# Patient Record
Sex: FEMALE | Race: ASIAN | NOT HISPANIC OR LATINO | URBAN - METROPOLITAN AREA
[De-identification: names, ages, dates, MRNs, and addresses within clinical notes are randomized per-mention and may not be internally consistent; named-entity substitution may affect disease eponyms.]

---

## 2020-08-06 ENCOUNTER — INPATIENT (INPATIENT)
Facility: HOSPITAL | Age: 45
LOS: 2 days | Discharge: HOME | End: 2020-08-09
Attending: FAMILY MEDICINE | Admitting: FAMILY MEDICINE
Payer: COMMERCIAL

## 2020-08-06 VITALS
DIASTOLIC BLOOD PRESSURE: 121 MMHG | TEMPERATURE: 98 F | RESPIRATION RATE: 20 BRPM | HEART RATE: 88 BPM | OXYGEN SATURATION: 99 % | SYSTOLIC BLOOD PRESSURE: 243 MMHG

## 2020-08-06 LAB
ALBUMIN SERPL ELPH-MCNC: 5.1 G/DL — SIGNIFICANT CHANGE UP (ref 3.5–5.2)
ALP SERPL-CCNC: 83 U/L — SIGNIFICANT CHANGE UP (ref 30–115)
ALT FLD-CCNC: 15 U/L — SIGNIFICANT CHANGE UP (ref 0–41)
ANION GAP SERPL CALC-SCNC: 17 MMOL/L — HIGH (ref 7–14)
ANISOCYTOSIS BLD QL: SIGNIFICANT CHANGE UP
APPEARANCE UR: CLEAR — SIGNIFICANT CHANGE UP
AST SERPL-CCNC: 20 U/L — SIGNIFICANT CHANGE UP (ref 0–41)
BACTERIA # UR AUTO: NEGATIVE — SIGNIFICANT CHANGE UP
BASOPHILS # BLD AUTO: 0.1 K/UL — SIGNIFICANT CHANGE UP (ref 0–0.2)
BASOPHILS NFR BLD AUTO: 0.9 % — SIGNIFICANT CHANGE UP (ref 0–1)
BILIRUB SERPL-MCNC: 0.2 MG/DL — SIGNIFICANT CHANGE UP (ref 0.2–1.2)
BILIRUB UR-MCNC: NEGATIVE — SIGNIFICANT CHANGE UP
BUN SERPL-MCNC: 14 MG/DL — SIGNIFICANT CHANGE UP (ref 10–20)
CALCIUM SERPL-MCNC: 10.2 MG/DL — HIGH (ref 8.5–10.1)
CHLORIDE SERPL-SCNC: 102 MMOL/L — SIGNIFICANT CHANGE UP (ref 98–110)
CO2 SERPL-SCNC: 21 MMOL/L — SIGNIFICANT CHANGE UP (ref 17–32)
COLOR SPEC: SIGNIFICANT CHANGE UP
CREAT SERPL-MCNC: 1 MG/DL — SIGNIFICANT CHANGE UP (ref 0.7–1.5)
DIFF PNL FLD: ABNORMAL
EOSINOPHIL # BLD AUTO: 0.19 K/UL — SIGNIFICANT CHANGE UP (ref 0–0.7)
EOSINOPHIL NFR BLD AUTO: 1.7 % — SIGNIFICANT CHANGE UP (ref 0–8)
EPI CELLS # UR: 3 /HPF — SIGNIFICANT CHANGE UP (ref 0–5)
GIANT PLATELETS BLD QL SMEAR: PRESENT — SIGNIFICANT CHANGE UP
GLUCOSE SERPL-MCNC: 117 MG/DL — HIGH (ref 70–99)
GLUCOSE UR QL: SIGNIFICANT CHANGE UP
HCT VFR BLD CALC: 31.8 % — LOW (ref 37–47)
HGB BLD-MCNC: 9.2 G/DL — LOW (ref 12–16)
HYALINE CASTS # UR AUTO: 0 /LPF — SIGNIFICANT CHANGE UP (ref 0–7)
HYPOCHROMIA BLD QL: SLIGHT — SIGNIFICANT CHANGE UP
KETONES UR-MCNC: ABNORMAL
LEUKOCYTE ESTERASE UR-ACNC: ABNORMAL
LYMPHOCYTES # BLD AUTO: 2.21 K/UL — SIGNIFICANT CHANGE UP (ref 1.2–3.4)
LYMPHOCYTES # BLD AUTO: 20.3 % — LOW (ref 20.5–51.1)
MAGNESIUM SERPL-MCNC: 2.5 MG/DL — HIGH (ref 1.8–2.4)
MANUAL SMEAR VERIFICATION: SIGNIFICANT CHANGE UP
MCHC RBC-ENTMCNC: 21 PG — LOW (ref 27–31)
MCHC RBC-ENTMCNC: 28.9 G/DL — LOW (ref 32–37)
MCV RBC AUTO: 72.6 FL — LOW (ref 81–99)
METAMYELOCYTES # FLD: 1.8 % — HIGH (ref 0–0)
MICROCYTES BLD QL: SIGNIFICANT CHANGE UP
MONOCYTES # BLD AUTO: 0.68 K/UL — HIGH (ref 0.1–0.6)
MONOCYTES NFR BLD AUTO: 6.2 % — SIGNIFICANT CHANGE UP (ref 1.7–9.3)
NEUTROPHILS # BLD AUTO: 7.34 K/UL — HIGH (ref 1.4–6.5)
NEUTROPHILS NFR BLD AUTO: 65.5 % — SIGNIFICANT CHANGE UP (ref 42.2–75.2)
NEUTS BAND # BLD: 1.8 % — SIGNIFICANT CHANGE UP (ref 0–6)
NITRITE UR-MCNC: NEGATIVE — SIGNIFICANT CHANGE UP
OVALOCYTES BLD QL SMEAR: SLIGHT — SIGNIFICANT CHANGE UP
PH UR: 8 — SIGNIFICANT CHANGE UP (ref 5–8)
PLAT MORPH BLD: NORMAL — SIGNIFICANT CHANGE UP
PLATELET # BLD AUTO: 421 K/UL — HIGH (ref 130–400)
POIKILOCYTOSIS BLD QL AUTO: SIGNIFICANT CHANGE UP
POLYCHROMASIA BLD QL SMEAR: SIGNIFICANT CHANGE UP
POTASSIUM SERPL-MCNC: 3.7 MMOL/L — SIGNIFICANT CHANGE UP (ref 3.5–5)
POTASSIUM SERPL-SCNC: 3.7 MMOL/L — SIGNIFICANT CHANGE UP (ref 3.5–5)
PROT SERPL-MCNC: 7.4 G/DL — SIGNIFICANT CHANGE UP (ref 6–8)
PROT UR-MCNC: ABNORMAL
RBC # BLD: 4.38 M/UL — SIGNIFICANT CHANGE UP (ref 4.2–5.4)
RBC # FLD: 18.4 % — HIGH (ref 11.5–14.5)
RBC BLD AUTO: ABNORMAL
RBC CASTS # UR COMP ASSIST: 194 /HPF — HIGH (ref 0–4)
SODIUM SERPL-SCNC: 140 MMOL/L — SIGNIFICANT CHANGE UP (ref 135–146)
SP GR SPEC: 1.01 — SIGNIFICANT CHANGE UP (ref 1.01–1.02)
TROPONIN T SERPL-MCNC: <0.01 NG/ML — SIGNIFICANT CHANGE UP
UROBILINOGEN FLD QL: SIGNIFICANT CHANGE UP
VARIANT LYMPHS # BLD: 1.8 % — SIGNIFICANT CHANGE UP (ref 0–5)
WBC # BLD: 10.9 K/UL — HIGH (ref 4.8–10.8)
WBC # FLD AUTO: 10.9 K/UL — HIGH (ref 4.8–10.8)
WBC UR QL: 14 /HPF — HIGH (ref 0–5)

## 2020-08-06 PROCEDURE — 70496 CT ANGIOGRAPHY HEAD: CPT | Mod: 26

## 2020-08-06 PROCEDURE — 99291 CRITICAL CARE FIRST HOUR: CPT

## 2020-08-06 PROCEDURE — 93010 ELECTROCARDIOGRAM REPORT: CPT

## 2020-08-06 PROCEDURE — 70450 CT HEAD/BRAIN W/O DYE: CPT | Mod: 26,59

## 2020-08-06 PROCEDURE — 71045 X-RAY EXAM CHEST 1 VIEW: CPT | Mod: 26

## 2020-08-06 PROCEDURE — 93979 VASCULAR STUDY: CPT | Mod: 26

## 2020-08-06 PROCEDURE — 74177 CT ABD & PELVIS W/CONTRAST: CPT | Mod: 26

## 2020-08-06 PROCEDURE — 93010 ELECTROCARDIOGRAM REPORT: CPT | Mod: 77

## 2020-08-06 RX ORDER — METOPROLOL TARTRATE 50 MG
50 TABLET ORAL DAILY
Refills: 0 | Status: DISCONTINUED | OUTPATIENT
Start: 2020-08-07 | End: 2020-08-07

## 2020-08-06 RX ORDER — FERROUS SULFATE 325(65) MG
1 TABLET ORAL
Qty: 0 | Refills: 0 | DISCHARGE

## 2020-08-06 RX ORDER — ACETAMINOPHEN 500 MG
650 TABLET ORAL ONCE
Refills: 0 | Status: COMPLETED | OUTPATIENT
Start: 2020-08-06 | End: 2020-08-06

## 2020-08-06 RX ORDER — ONDANSETRON 8 MG/1
4 TABLET, FILM COATED ORAL ONCE
Refills: 0 | Status: COMPLETED | OUTPATIENT
Start: 2020-08-06 | End: 2020-08-06

## 2020-08-06 RX ORDER — HEPARIN SODIUM 5000 [USP'U]/ML
5000 INJECTION INTRAVENOUS; SUBCUTANEOUS EVERY 8 HOURS
Refills: 0 | Status: DISCONTINUED | OUTPATIENT
Start: 2020-08-06 | End: 2020-08-09

## 2020-08-06 RX ORDER — PANTOPRAZOLE SODIUM 20 MG/1
40 TABLET, DELAYED RELEASE ORAL
Refills: 0 | Status: DISCONTINUED | OUTPATIENT
Start: 2020-08-06 | End: 2020-08-09

## 2020-08-06 RX ORDER — CHLORHEXIDINE GLUCONATE 213 G/1000ML
1 SOLUTION TOPICAL DAILY
Refills: 0 | Status: DISCONTINUED | OUTPATIENT
Start: 2020-08-06 | End: 2020-08-09

## 2020-08-06 RX ORDER — LISINOPRIL 2.5 MG/1
10 TABLET ORAL DAILY
Refills: 0 | Status: DISCONTINUED | OUTPATIENT
Start: 2020-08-06 | End: 2020-08-09

## 2020-08-06 RX ORDER — NICARDIPINE HYDROCHLORIDE 30 MG/1
5 CAPSULE, EXTENDED RELEASE ORAL
Qty: 40 | Refills: 0 | Status: DISCONTINUED | OUTPATIENT
Start: 2020-08-06 | End: 2020-08-07

## 2020-08-06 RX ORDER — NICARDIPINE HYDROCHLORIDE 30 MG/1
5 CAPSULE, EXTENDED RELEASE ORAL
Qty: 40 | Refills: 0 | Status: DISCONTINUED | OUTPATIENT
Start: 2020-08-06 | End: 2020-08-06

## 2020-08-06 RX ORDER — FERROUS SULFATE 325(65) MG
325 TABLET ORAL THREE TIMES A DAY
Refills: 0 | Status: DISCONTINUED | OUTPATIENT
Start: 2020-08-06 | End: 2020-08-09

## 2020-08-06 RX ORDER — PANTOPRAZOLE SODIUM 20 MG/1
40 TABLET, DELAYED RELEASE ORAL DAILY
Refills: 0 | Status: DISCONTINUED | OUTPATIENT
Start: 2020-08-06 | End: 2020-08-06

## 2020-08-06 RX ADMIN — NICARDIPINE HYDROCHLORIDE 25 MG/HR: 30 CAPSULE, EXTENDED RELEASE ORAL at 15:56

## 2020-08-06 RX ADMIN — Medication 650 MILLIGRAM(S): at 23:01

## 2020-08-06 RX ADMIN — ONDANSETRON 4 MILLIGRAM(S): 8 TABLET, FILM COATED ORAL at 15:55

## 2020-08-06 RX ADMIN — PANTOPRAZOLE SODIUM 40 MILLIGRAM(S): 20 TABLET, DELAYED RELEASE ORAL at 23:31

## 2020-08-06 NOTE — ED PROVIDER NOTE - OBJECTIVE STATEMENT
45yF pmhx HTN c/o dizziness starting earlier this morning, worsening; intermittent; associated w/ nausea; states she took BP at home was 230s systolic. Reports recently being rx'ed metoprolol which she has been taking approx 5 days, as well as anemia Hb 8. Pt denies fever/chills, chest pain, SOB, abd pain. 45yF pmhx HTN c/o dizziness starting earlier this morning, worsening; intermittent; associated w/ nausea; states she took BP at home was 230s systolic. Reports recently being rx'ed metoprolol which she has been taking approx 5 days, as well as anemia Hb 8 last week. Pt denies fever/chills, chest pain, SOB, abd pain. 45yF pmhx HTN c/o dizziness starting earlier this morning, worsening; intermittent; associated w/ nausea; states she took BP at home was 230s systolic. Reports recently being rx'ed metoprolol which she has been taking approx 5 days, as well as anemia Hb 8 last week. Pt denies fever/chills, chest pain, SOB, abd pain.   Please call Dr Jaden Arvizu w/ any updates [phone].

## 2020-08-06 NOTE — H&P ADULT - ASSESSMENT
IMPRESSION:  Hypertensive emergency   Proteinuria/ dizziness     PLAN:    CNS: monitor for signs of increased ICP    HEENT: Oral care    PULMONARY:  HOB @ 45 degrees, no signs of pulmonary edema     CARDIOVASCULAR: control bp    GI: GI prophylaxis.  Feeding     RENAL:  Follow up lytes.  Correct as needed    INFECTIOUS DISEASE: Follow up cultures    HEMATOLOGICAL:  DVT prophylaxis.    ENDOCRINE:  Follow up FS.  Insulin protocol if needed.    MUSCULOSKELETAL: IMPRESSION:  Hypertensive emergency   Proteinuria/ nausea vomiting and dizziness     PLAN:    CNS: Ct head negative, CT angio negative for LVO- fu MRI brain and neuro c/s -monitor for signs of increased ICP    HEENT: Oral care    PULMONARY:  HOB @ 45 degrees, no signs of pulmonary edema     CARDIOVASCULAR: started nicardipine, control systolic bp to 25-35 % decrease in bp for first 23 hours; so target systolic around 160 mmHg transition to PO meds in am consider adding ACEi to metoprolol home dose due to proteinuria; fu echo to r/o LVH      GI: GI prophylaxis.  Feeding     RENAL:  Follow up lytes.  Correct as needed    INFECTIOUS DISEASE: Follow up cultures    HEMATOLOGICAL:  DVT prophylaxis.    ENDOCRINE:  Follow up FS.  Insulin protocol if needed. IMPRESSION:  Hypertensive emergency   Proteinuria/ nausea vomiting and dizziness     PLAN:    CNS: Ct head negative, CT angio negative for LVO- fu MRI brain and neuro c/s -monitor for signs of increased ICP    HEENT: Oral care    PULMONARY:  HOB @ 45 degrees, no signs of pulmonary edema     CARDIOVASCULAR: started nicardipine, control systolic bp to 25-35 % decrease in bp for first 23 hours; so target systolic around 160 mmHg transition to PO meds in am- started lisinopril as has proteinuria; fu echo to r/o LVH  titrate off drip       GI: GI prophylaxis.  Feeding     RENAL:  Follow up lytes.  Correct as needed    INFECTIOUS DISEASE: Follow up cultures    HEMATOLOGICAL:  DVT prophylaxis.    ENDOCRINE:  Follow up FS.  Insulin protocol if needed. IMPRESSION:    Hypertensive urgency  Proteinuria/ nausea vomiting and dizziness   ? WES    PLAN:    CNS: Ct head negative, CT angio negative for LVO- fu MRI brain    HEENT: Oral care    PULMONARY:  HOB @ 45 degrees,    CARDIOVASCULAR: ECHO, metoprolol, ACE I    GI: GI prophylaxis.  Feeding     RENAL:  Follow up lytes.  Correct as needed    INFECTIOUS DISEASE: Follow up cultures    HEMATOLOGICAL:  DVT prophylaxis.    ENDOCRINE:  Follow up FS.  Insulin protocol if needed.    transfer to floor

## 2020-08-06 NOTE — PROGRESS NOTE ADULT - ASSESSMENT
HTN crisis  dizziness, N/V, diaphoresis  - on Nicardipine drip at this time, SBP now in 160s  - in ICU; appreciate ICU  and care  - workup ongoing; CT abd and CT/CTA of brain noted.  Await renal sono/doppler and echo, serum and urine catecholamines, other labs  - Cardiology Consult - [facility]  - Neuro consult appreciated; will follow per their recommendations    Iron-deficient anemia  - continue to monitor Hb  - stable at present    Remote hx of ovarian mass resection  - CT Abd/pelvis results noted.  Can f/u with markers later after initial crisis resolved    Continue all other meds and management for current conditions  Prognosis guarded  Will follow with ICU team leading for now  All follow-up appreciated

## 2020-08-06 NOTE — ED PROVIDER NOTE - PROGRESS NOTE DETAILS
AG: d/w Dr Arvizu asks for Ct abd/pel and adrenal labs, d/w neuro recs CTA head s/o to Dr. Capps, AG: ICU requests Utox and lactate

## 2020-08-06 NOTE — CONSULT NOTE ADULT - ASSESSMENT
Impression:  46 yo woman pmhx HTN presents with dizziness starting earlier this morning, worsening; intermittent; associated w/ nausea; states she took BP at home was 230s systolic. Reports recently being prescribed metoprolol which she has been taking for 5 days. NIHSS 0 dizziness more associated with lightheadedness and less room spinning.     Suggestion:  Control blood pressure  CTH now  CTA head and neck  Can do MRI brain without robert.     Lloyd Mason NP  [phone]

## 2020-08-06 NOTE — ED ADULT TRIAGE NOTE - CHIEF COMPLAINT QUOTE
c/o hypertension x few days, Patient recently started on metoprolol. +vertigo c/o hypertension x few days, Patient recently started on metoprolol. +vertigo +vomiting

## 2020-08-06 NOTE — CONSULT NOTE ADULT - SUBJECTIVE AND OBJECTIVE BOX
Neurology Consult    Patient is a 45y old  Female who presents with a chief complaint of dizziness	    HPI:  44 yo woman pmhx HTN presents with dizziness starting earlier this morning, worsening; intermittent; associated w/ nausea; states she took BP at home was 230s systolic. Reports recently being prescribed metoprolol which she has been taking for 5 days.     PAST MEDICAL & SURGICAL HISTORY:  Hypertension  No significant past surgical history          MEDICATIONS  (STANDING):  niCARdipine Infusion 5 mG/Hr (25 mL/Hr) IV Continuous <Continuous>    MEDICATIONS  (PRN):    Vital Signs Last 24 Hrs  T(C): 36.9 (06 Aug 2020 15:25), Max: 36.9 (06 Aug 2020 15:25)  T(F): 98.4 (06 Aug 2020 15:25), Max: 98.4 (06 Aug 2020 15:25)  HR: 96 (06 Aug 2020 16:57) (75 - 97)  BP: 168/88 (06 Aug 2020 16:57) (163/84 - 243/121)  BP(mean): --  RR: 18 (06 Aug 2020 16:00) (18 - 20)  SpO2: 100% (06 Aug 2020 16:00) (99% - 100%)    Examination:  General:  Appearance is consistent with chronologic age.  No abnormal facies.  Gross skin survey within normal limits.    Cognitive/Language:  The patient is oriented to person, place, time and date.  Recent and remote memory intact.  Fund of knowledge is intact and normal.  Language with normal repetition, comprehension and naming.  Nondysarthric.    Eyes: intact VA, VFF.  EOMI w/o nystagmus, skew or reported double vision.  PERRL.  No ptosis/weakness of eyelid closure.    Face:  Facial sensation normal V1 - 3, no facial asymmetry.    Motor examination:   Normal tone, bulk and range of motion.  No tenderness, twitching, tremors or involuntary movements.  Formal Muscle Strength Testing: (MRC grade R/L) 5/5 UE; 5/5 LE.  No observable drift.  Cerebellum:   FTN/HKS intact with normal VICTOR MANUEL in all limbs.  No dysmetria or dysdiadokinesia.  Gait deferred    NIHSS 0    Labs:   CBC Full  -  ( 06 Aug 2020 15:55 )  WBC Count : 10.90 K/uL  RBC Count : 4.38 M/uL  Hemoglobin : 9.2 g/dL  Hematocrit : 31.8 %  Platelet Count - Automated : 421 K/uL  Mean Cell Volume : 72.6 fL  Mean Cell Hemoglobin : 21.0 pg  Mean Cell Hemoglobin Concentration : 28.9 g/dL  Auto Neutrophil # : 7.34 K/uL  Auto Lymphocyte # : 2.21 K/uL  Auto Monocyte # : 0.68 K/uL  Auto Eosinophil # : 0.19 K/uL  Auto Basophil # : 0.10 K/uL  Auto Neutrophil % : 65.5 %  Auto Lymphocyte % : 20.3 %  Auto Monocyte % : 6.2 %  Auto Eosinophil % : 1.7 %  Auto Basophil % : 0.9 %        140  |  102  |  14  ----------------------------<  117<H>  3.7   |  21  |  1.0    Ca    10.2<H>      06 Aug 2020 15:55    TPro  7.4  /  Alb  5.1  /  TBili  0.2  /  DBili  x   /  AST  20  /  ALT  15  /  AlkPhos  83  08-06    LIVER FUNCTIONS - ( 06 Aug 2020 15:55 )  Alb: 5.1 g/dL / Pro: 7.4 g/dL / ALK PHOS: 83 U/L / ALT: 15 U/L / AST: 20 U/L / GGT: x             Urinalysis Basic - ( 06 Aug 2020 18:00 )    Color: Light Yellow / Appearance: Clear / S.013 / pH: x  Gluc: x / Ketone: Small  / Bili: Negative / Urobili: <2 mg/dL   Blood: x / Protein: 30 mg/dL / Nitrite: Negative   Leuk Esterase: Small / RBC: x / WBC x   Sq Epi: x / Non Sq Epi: x / Bacteria: x          Neuroimaging:  NCHCT:   pending  20 @ 19:55

## 2020-08-06 NOTE — ED PROVIDER NOTE - CLINICAL SUMMARY MEDICAL DECISION MAKING FREE TEXT BOX
I personally evaluated the patient. I reviewed the Resident’s or Physician Assistant’s note (as assigned above), and agree with the findings and plan except as documented in my note. Patient signed out to me from Dr. Wallis. Pending Ct scans. Labs, Ct, ekg performed. Cardene drip initiated from ED. Nausea and sx began resolving while in ED, neurologically intact. Discussed with PMD Dr. Arvizu. Critical care/icu consult placed. Admitted to icu for further evaluation and treatment.

## 2020-08-06 NOTE — ED PROVIDER NOTE - PHYSICAL EXAMINATION
Vital Signs: Reviewed  GEN: alert, NAD, speaks full sentences  HEAD:  normocephalic, atraumatic  EYES:  PERRLA; conjunctivae without injection, drainage or discharge  ENMT:  nasal mucosa moist; mouth moist without ulcerations or lesions; throat moist without erythema, exudate, ulcerations or lesions  NECK:  supple  CARDIAC:  regular rate, normal S1 and S2, no murmurs  RESP:  respiratory rate and effort appear normal for age; lungs are clear to auscultation bilaterally; no rales or wheezes  ABDOMEN:  soft, nontender, nondistended  MUSCULOSKELETAL/NEURO:  normal movement, normal tone  SKIN:  normal skin color for age and race, well-perfused; warm and dry

## 2020-08-06 NOTE — ED PROVIDER NOTE - NS ED ROS FT
Review of Systems:  	•	CONSTITUTIONAL - no fever, no diaphoresis  	•	SKIN - no rash, no lesions  	•	HEMATOLOGIC - no bleeding, no bruising  	•	EYES - no discharge, no injection  	•	ENT - no sore throat, no runny nose  	•	RESPIRATORY - no shortness of breath, no cough  	•	CARDIAC - no chest pain, no palpitations  	•	GI - no abd pain, +nausea, no vomiting, no diarrhea  	•	GENITO-URINARY - no dysuria, no hematuria  	•	MUSCULOSKELETAL - no joint pain, no muscle aches  	•	NEUROLOGIC - +dizziness, no headache

## 2020-08-06 NOTE — H&P ADULT - HISTORY OF PRESENT ILLNESS
46 yo woman pmhx HTN presents with dizziness starting earlier this morning, worsening; intermittent; associated w/ nausea . Patient reporting dizziness more associated with lightheadedness but no room spinning. She states she took BP at home was 230s systolic. No signs of any agitation, delirium, stupor, seizures, or visual disturbances.   She denies any chest pain, and no shortness of breath. Patient reports recently being prescribed metoprolol which she has been taking for 5 days.    ER course on admission vitals were as bp 243/121 mmHG, HR 88 bpm, patient is afebrile   labs revealed a Hg of 9.2 Cr 1.0, troponin negative   EKG done revealed T wave inversion in leads II, III, avF . UA revealed proteinuria   CT brain was done that was negative and CT abd/pelvis negative   neuro consulted NIHSS score 0, and CTAngio head and neck done   patient started on nicardipine drip 44 yo female with pmh of HTN, heavy menses and iron def anemia, ? migraines, ovarian mass removal; no f/u and no meds for years; recently started on anti-HTN meds (metoprolol succinate up to 50 mg QD) when routine eye dr visit revealed a SBP in the 220's; initially pt had some reduction in SBP down to the 170's but today pt had dizziness in the early am which worsened and led to nausea and vomiting.  she took her BP at the time despite being on meds was 220's/120's; when she presented to the ED her BP max was 244/114. she was very dizzy described more lightheadedness than room spinning and nauseous, and diaphoretic; patient complained of some chest pressure while coming to the ED from [place] . Patient reporting dizziness more associated with lightheadedness but no room spinning. No signs of any agitation, delirium, stupor, seizures, or visual disturbances.   She denies any chest pain, and no shortness of breath. Patient reports recently being prescribed metoprolol which she has been taking for 5 days.    ER course on admission vitals were as bp 243/121 mmHG, HR 88 bpm, patient is afebrile   labs revealed a Hg of 9.2 Cr 1.0, troponin negative   EKG done revealed T wave inversion in leads II, III, avF . UA revealed proteinuria   CT brain was done that was negative and CT abd/pelvis negative   neuro consulted NIHSS score 0, and CTAngio head and neck done   patient started on nicardipine drip 46 yo female with pmh of HTN, heavy menses and iron def anemia, ? migraines, ovarian mass removal; no f/u and no meds for years; recently started on anti-HTN meds (metoprolol succinate up to 50 mg QD) when routine eye dr visit revealed a SBP in the 220's; initially pt had some reduction in SBP down to the 170's but today pt had dizziness in the early am which worsened and led to nausea and vomiting.  she took her BP at the time despite being on meds was 220's/120's; when she presented to the ED her BP max was 244/114. she was very dizzy described more lightheadedness than room spinning and nauseous, and diaphoretic; patient complained of some chest pressure while coming to the ED from [place] . Patient reporting dizziness more associated with lightheadedness but no room spinning. No signs of any agitation, delirium, stupor, seizures, or visual disturbances.   She denies any chest pain, and no shortness of breath. Patient reports recently being prescribed metoprolol which she has been taking for 5 days.    ER course on admission vitals were as bp 243/121 mmHG, HR 88 bpm, patient is afebrile   labs revealed a Hg of 9.2 Cr 1.0, troponin negative   EKG done revealed T wave inversion in leads II, III, avF . UA revealed proteinuria     CT brain was done that was negative and CT abd/pelvis negative   neuro consulted NIHSS score 0, and CTAngio head and neck done   patient started on nicardipine drip now off

## 2020-08-06 NOTE — ED PROVIDER NOTE - ATTENDING CONTRIBUTION TO CARE
45 F to ED with CP, diziness and elevated bp  h/o HTN uncontrolled at home with little primary care  worsening sx over past few days so to ED for eval.   PT's PMD tried otc meds but not improving.  AVSS, exam as noted, CTAB, RRR, abdomen soft NTND, (+) bowel sounds,

## 2020-08-06 NOTE — H&P ADULT - NSHPLABSRESULTS_GEN_ALL_CORE
9.2    10.90 )-----------( 421      ( 06 Aug 2020 15:55 )             31.8       08-06    140  |  102  |  14  ----------------------------<  117<H>  3.7   |  21  |  1.0    Ca    10.2<H>      06 Aug 2020 15:55    TPro  7.4  /  Alb  5.1  /  TBili  0.2  /  DBili  x   /  AST  20  /  ALT  15  /  AlkPhos  83  08-06      LIVER FUNCTIONS - ( 06 Aug 2020 15:55 )  Alb: 5.1 g/dL / Pro: 7.4 g/dL / ALK PHOS: 83 U/L / ALT: 15 U/L / AST: 20 U/L / GGT: x                 Urinalysis Basic - ( 06 Aug 2020 18:00 )    Color: Light Yellow / Appearance: Clear / S.013 / pH: x  Gluc: x / Ketone: Small  / Bili: Negative / Urobili: <2 mg/dL   Blood: x / Protein: 30 mg/dL / Nitrite: Negative   Leuk Esterase: Small / RBC: 194 /HPF / WBC 14 /HPF   Sq Epi: x / Non Sq Epi: 3 /HPF / Bacteria: Negative            CARDIAC MARKERS ( 06 Aug 2020 15:55 )  x     / <0.01 ng/mL / x     / x     / x

## 2020-08-07 LAB
ALBUMIN SERPL ELPH-MCNC: 4.7 G/DL — SIGNIFICANT CHANGE UP (ref 3.5–5.2)
ALP SERPL-CCNC: 74 U/L — SIGNIFICANT CHANGE UP (ref 30–115)
ALT FLD-CCNC: 13 U/L — SIGNIFICANT CHANGE UP (ref 0–41)
ANION GAP SERPL CALC-SCNC: 13 MMOL/L — SIGNIFICANT CHANGE UP (ref 7–14)
ANION GAP SERPL CALC-SCNC: 14 MMOL/L — SIGNIFICANT CHANGE UP (ref 7–14)
AST SERPL-CCNC: 14 U/L — SIGNIFICANT CHANGE UP (ref 0–41)
BASOPHILS # BLD AUTO: 0.04 K/UL — SIGNIFICANT CHANGE UP (ref 0–0.2)
BASOPHILS NFR BLD AUTO: 0.3 % — SIGNIFICANT CHANGE UP (ref 0–1)
BILIRUB SERPL-MCNC: 0.4 MG/DL — SIGNIFICANT CHANGE UP (ref 0.2–1.2)
BUN SERPL-MCNC: 14 MG/DL — SIGNIFICANT CHANGE UP (ref 10–20)
BUN SERPL-MCNC: 15 MG/DL — SIGNIFICANT CHANGE UP (ref 10–20)
CALCIUM SERPL-MCNC: 9.7 MG/DL — SIGNIFICANT CHANGE UP (ref 8.5–10.1)
CALCIUM SERPL-MCNC: 9.9 MG/DL — SIGNIFICANT CHANGE UP (ref 8.5–10.1)
CHLORIDE SERPL-SCNC: 101 MMOL/L — SIGNIFICANT CHANGE UP (ref 98–110)
CHLORIDE SERPL-SCNC: 103 MMOL/L — SIGNIFICANT CHANGE UP (ref 98–110)
CO2 SERPL-SCNC: 23 MMOL/L — SIGNIFICANT CHANGE UP (ref 17–32)
CO2 SERPL-SCNC: 24 MMOL/L — SIGNIFICANT CHANGE UP (ref 17–32)
CREAT SERPL-MCNC: 0.9 MG/DL — SIGNIFICANT CHANGE UP (ref 0.7–1.5)
CREAT SERPL-MCNC: 0.9 MG/DL — SIGNIFICANT CHANGE UP (ref 0.7–1.5)
EOSINOPHIL # BLD AUTO: 0.02 K/UL — SIGNIFICANT CHANGE UP (ref 0–0.7)
EOSINOPHIL NFR BLD AUTO: 0.1 % — SIGNIFICANT CHANGE UP (ref 0–8)
GLUCOSE SERPL-MCNC: 115 MG/DL — HIGH (ref 70–99)
GLUCOSE SERPL-MCNC: 116 MG/DL — HIGH (ref 70–99)
HCT VFR BLD CALC: 28.8 % — LOW (ref 37–47)
HGB BLD-MCNC: 8.3 G/DL — LOW (ref 12–16)
IMM GRANULOCYTES NFR BLD AUTO: 0.8 % — HIGH (ref 0.1–0.3)
LYMPHOCYTES # BLD AUTO: 1.6 K/UL — SIGNIFICANT CHANGE UP (ref 1.2–3.4)
LYMPHOCYTES # BLD AUTO: 11.6 % — LOW (ref 20.5–51.1)
MAGNESIUM SERPL-MCNC: 2.5 MG/DL — HIGH (ref 1.8–2.4)
MCHC RBC-ENTMCNC: 20.9 PG — LOW (ref 27–31)
MCHC RBC-ENTMCNC: 28.8 G/DL — LOW (ref 32–37)
MCV RBC AUTO: 72.5 FL — LOW (ref 81–99)
MONOCYTES # BLD AUTO: 0.6 K/UL — SIGNIFICANT CHANGE UP (ref 0.1–0.6)
MONOCYTES NFR BLD AUTO: 4.4 % — SIGNIFICANT CHANGE UP (ref 1.7–9.3)
NEUTROPHILS # BLD AUTO: 11.38 K/UL — HIGH (ref 1.4–6.5)
NEUTROPHILS NFR BLD AUTO: 82.8 % — HIGH (ref 42.2–75.2)
NRBC # BLD: 0 /100 WBCS — SIGNIFICANT CHANGE UP (ref 0–0)
PLATELET # BLD AUTO: 379 K/UL — SIGNIFICANT CHANGE UP (ref 130–400)
POTASSIUM SERPL-MCNC: 3.5 MMOL/L — SIGNIFICANT CHANGE UP (ref 3.5–5)
POTASSIUM SERPL-MCNC: 4 MMOL/L — SIGNIFICANT CHANGE UP (ref 3.5–5)
POTASSIUM SERPL-SCNC: 3.5 MMOL/L — SIGNIFICANT CHANGE UP (ref 3.5–5)
POTASSIUM SERPL-SCNC: 4 MMOL/L — SIGNIFICANT CHANGE UP (ref 3.5–5)
PROT SERPL-MCNC: 6.7 G/DL — SIGNIFICANT CHANGE UP (ref 6–8)
RBC # BLD: 3.97 M/UL — LOW (ref 4.2–5.4)
RBC # FLD: 18.2 % — HIGH (ref 11.5–14.5)
SARS-COV-2 RNA SPEC QL NAA+PROBE: SIGNIFICANT CHANGE UP
SODIUM SERPL-SCNC: 138 MMOL/L — SIGNIFICANT CHANGE UP (ref 135–146)
SODIUM SERPL-SCNC: 140 MMOL/L — SIGNIFICANT CHANGE UP (ref 135–146)
WBC # BLD: 13.75 K/UL — HIGH (ref 4.8–10.8)
WBC # FLD AUTO: 13.75 K/UL — HIGH (ref 4.8–10.8)

## 2020-08-07 PROCEDURE — 70551 MRI BRAIN STEM W/O DYE: CPT | Mod: 26

## 2020-08-07 PROCEDURE — 74174 CTA ABD&PLVS W/CONTRAST: CPT | Mod: 26

## 2020-08-07 PROCEDURE — 99291 CRITICAL CARE FIRST HOUR: CPT

## 2020-08-07 PROCEDURE — 93306 TTE W/DOPPLER COMPLETE: CPT | Mod: 26

## 2020-08-07 PROCEDURE — 71045 X-RAY EXAM CHEST 1 VIEW: CPT | Mod: 26

## 2020-08-07 RX ORDER — NIFEDIPINE 30 MG
30 TABLET, EXTENDED RELEASE 24 HR ORAL ONCE
Refills: 0 | Status: DISCONTINUED | OUTPATIENT
Start: 2020-08-07 | End: 2020-08-07

## 2020-08-07 RX ORDER — NICARDIPINE HYDROCHLORIDE 30 MG/1
5 CAPSULE, EXTENDED RELEASE ORAL
Qty: 40 | Refills: 0 | Status: DISCONTINUED | OUTPATIENT
Start: 2020-08-07 | End: 2020-08-07

## 2020-08-07 RX ORDER — NIFEDIPINE 30 MG
30 TABLET, EXTENDED RELEASE 24 HR ORAL DAILY
Refills: 0 | Status: DISCONTINUED | OUTPATIENT
Start: 2020-08-07 | End: 2020-08-07

## 2020-08-07 RX ORDER — NIFEDIPINE 30 MG
60 TABLET, EXTENDED RELEASE 24 HR ORAL DAILY
Refills: 0 | Status: DISCONTINUED | OUTPATIENT
Start: 2020-08-08 | End: 2020-08-09

## 2020-08-07 RX ORDER — NIFEDIPINE 30 MG
30 TABLET, EXTENDED RELEASE 24 HR ORAL ONCE
Refills: 0 | Status: COMPLETED | OUTPATIENT
Start: 2020-08-07 | End: 2020-08-07

## 2020-08-07 RX ORDER — ONDANSETRON 8 MG/1
4 TABLET, FILM COATED ORAL ONCE
Refills: 0 | Status: COMPLETED | OUTPATIENT
Start: 2020-08-07 | End: 2020-08-07

## 2020-08-07 RX ORDER — LABETALOL HCL 100 MG
100 TABLET ORAL THREE TIMES A DAY
Refills: 0 | Status: DISCONTINUED | OUTPATIENT
Start: 2020-08-07 | End: 2020-08-09

## 2020-08-07 RX ADMIN — Medication 325 MILLIGRAM(S): at 22:23

## 2020-08-07 RX ADMIN — LISINOPRIL 10 MILLIGRAM(S): 2.5 TABLET ORAL at 00:52

## 2020-08-07 RX ADMIN — ONDANSETRON 4 MILLIGRAM(S): 8 TABLET, FILM COATED ORAL at 00:01

## 2020-08-07 RX ADMIN — Medication 50 MILLIGRAM(S): at 05:13

## 2020-08-07 RX ADMIN — Medication 650 MILLIGRAM(S): at 00:00

## 2020-08-07 RX ADMIN — Medication 30 MILLIGRAM(S): at 10:43

## 2020-08-07 RX ADMIN — Medication 325 MILLIGRAM(S): at 05:13

## 2020-08-07 RX ADMIN — HEPARIN SODIUM 5000 UNIT(S): 5000 INJECTION INTRAVENOUS; SUBCUTANEOUS at 14:28

## 2020-08-07 RX ADMIN — Medication 100 MILLIGRAM(S): at 22:23

## 2020-08-07 RX ADMIN — NICARDIPINE HYDROCHLORIDE 25 MG/HR: 30 CAPSULE, EXTENDED RELEASE ORAL at 07:00

## 2020-08-07 RX ADMIN — HEPARIN SODIUM 5000 UNIT(S): 5000 INJECTION INTRAVENOUS; SUBCUTANEOUS at 05:13

## 2020-08-07 RX ADMIN — Medication 30 MILLIGRAM(S): at 15:01

## 2020-08-07 RX ADMIN — HEPARIN SODIUM 5000 UNIT(S): 5000 INJECTION INTRAVENOUS; SUBCUTANEOUS at 22:23

## 2020-08-07 RX ADMIN — Medication 325 MILLIGRAM(S): at 14:29

## 2020-08-07 NOTE — CONSULT NOTE ADULT - ASSESSMENT
Patient off the drip  doing better  add Procardia XL 30 mg  echocardiogram  renal duplex for renal artery stenosis.  discussed with team in rounds

## 2020-08-07 NOTE — PROVIDER CONTACT NOTE (OTHER) - SITUATION
spoke with MD to inform that pt has a manual BP of 200/110 and a HR of 84. Currently stating she does not have symptoms. Informed MD that I gave pt PO labetalol 100mg.

## 2020-08-07 NOTE — PROGRESS NOTE ADULT - ASSESSMENT
HTN crisis  dizziness, N/V, diaphoresis - sxs improving  Mild/moderate left ROXANA per renal sono; results of renal angiogram pending  - s/p nicardipine drip; BP's fluctuating but pt not symptomatic  - s/p ICU, now on regular floor  - workup ongoing; CT abd and CT/CTA of brain noted.  Renal sono and echo noted as above.  - Cardiology f/u appreciated - [place]  - Neuro consult appreciated; will follow per their recommendations.  Await results of MRI brain    Iron-deficient anemia  - continue to monitor Hb; continue iron  - stable at present    Remote hx of ovarian mass resection  - CT Abd/pelvis results noted.  Can f/u with markers later after initial crisis resolved    Continue all other meds and management for current conditions  Prognosis guarded  Will follow with ICU team leading for now  All follow-up appreciated HTN crisis  dizziness, N/V, diaphoresis - sxs improving  Mild/moderate left ROXANA per renal sono; results of renal angiogram pending  - s/p nicardipine drip; BP's fluctuating but pt not symptomatic  - s/p ICU, now on regular floor  - workup ongoing; CT abd and CT/CTA of brain noted.  Renal sono and echo noted as above.  - Cardiology f/u appreciated - [place]  - Neuro consult appreciated; will follow per their recommendations.  Await results of MRI brain    Iron-deficient anemia  - continue to monitor Hb; continue iron  - stable at present    Remote hx of ovarian mass resection  - CT Abd/pelvis results noted.  Can f/u with markers later after initial crisis resolved    Continue all other meds and management for current conditions  Prognosis guarded but improving  All follow-up appreciated

## 2020-08-07 NOTE — PROGRESS NOTE ADULT - ASSESSMENT
This is a case of a 44 yo female KTH:  1. HTN  2. heavy menses and iron def anemia  3. migraines??  4. ovarian mass removal  5. HTN, of meds for years    Patient was admitted for hypertensive urgency /121 mmHG, HR 88 bpm,     #HTN  off niocardipine drip  evaluated by cardiology  Switched to oral meds: metoprolol, lisinopril, and procardia XR  Consider downgrade today    #dizziness  CT head negative  symptoms resolved after BP control    Diet: dash diet  Activity: As tolerated  Dispo: downgrade to regular floor

## 2020-08-07 NOTE — CONSULT NOTE ADULT - SUBJECTIVE AND OBJECTIVE BOX
Date of Admission:    CHIEF COMPLAINT: HTN    HISTORY OF PRESENT ILLNESS: 45yFemale with PMH below presented to the hospital for HTN crisis, severe headaches.    PAST MEDICAL & SURGICAL HISTORY:  Hypertension  No significant past surgical history    HEALTH ISSUES - PROBLEM Dx:        FAMILY HISTORY:  No pertinent family history in first degree relatives    Allergies    apple (Anaphylaxis)  No Known Drug Allergies    Intolerances    	  Home Medications:  ferrous sulfate 325 mg (65 mg elemental iron) oral tablet: 1 tab(s) orally 3 times a day (06 Aug 2020 23:59)  metoprolol succinate 50 mg oral tablet, extended release: 1 tab(s) orally once a day (06 Aug 2020 23:59)    MEDICATIONS  (STANDING):  chlorhexidine 4% Liquid 1 Application(s) Topical daily  ferrous    sulfate 325 milliGRAM(s) Oral three times a day  heparin   Injectable 5000 Unit(s) SubCutaneous every 8 hours  lisinopril 10 milliGRAM(s) Oral daily  metoprolol succinate ER 50 milliGRAM(s) Oral daily  niCARdipine Infusion 5 mG/Hr (25 mL/Hr) IV Continuous <Continuous>  pantoprazole    Tablet 40 milliGRAM(s) Oral before breakfast    MEDICATIONS  (PRN):              SOCIAL HISTORY:    [x ] Non-smoker  [ ] Smoker  [ ] Alcohol      REVIEW OF SYSTEMS:  CONSTITUTIONAL: No fever, weight loss, or fatigue  CARDIOLOGY: PAtient denies chest pain, shortness of breath or syncopal episodes.   RESPIRATORY: denies shortness of breath, wheezeing.   NEUROLOGICAL: NO weakness, no focal deficits to report.  ENDOCRINOLOGICAL: no recent change in diabetic medications.   GI: no BRBPR, no N,V,diarrhea.    PSYCHIATRY: normal mood and affect  HEENT: no nasal discharge, no ecchymosis  SKIN: no ecchymosis, no breakdown  MUSCULOSKELETAL: Full range of motion x4.      PHYSICAL EXAM:  T(C): 36.7 (08-07-20 @ 04:00), Max: 36.9 (08-06-20 @ 15:25)  HR: 72 (08-07-20 @ 07:00) (72 - 106)  BP: 141/92 (08-07-20 @ 07:00) (137/80 - 243/121)  RR: 18 (08-07-20 @ 07:00) (12 - 34)  SpO2: 98% (08-07-20 @ 07:00) (96% - 100%)  Wt(kg): --  I&O's Summary    06 Aug 2020 07:01  -  07 Aug 2020 07:00  --------------------------------------------------------  IN: 155 mL / OUT: 1250 mL / NET: -1095 mL    07 Aug 2020 07:01  -  07 Aug 2020 09:00  --------------------------------------------------------  IN: 0 mL / OUT: 400 mL / NET: -400 mL      Daily Height in cm: 165.1 (06 Aug 2020 21:45)    Daily     General Appearance: Normal	  Cardiovascular: Normal S1 S2, No JVD, No murmurs, No edema  Respiratory: Lungs clear to auscultation	  Psychiatry: A & O x 3, Mood & affect appropriate  Gastrointestinal:  Soft, Non-tender  Skin: No rashes, No ecchymoses, No cyanosis	  Neurologic: Non-focal  Extremities: Normal range of motion, No clubbing, cyanosis or edema  Vascular: Peripheral pulses palpable 2+ bilaterally        LABS:	 	                          8.3    13.75 )-----------( 379      ( 07 Aug 2020 04:48 )             28.8     08-07    138  |  101  |  14  ----------------------------<  116<H>  3.5   |  23  |  0.9    Ca    9.7      07 Aug 2020 04:48  Mg     2.5     08-07    TPro  6.7  /  Alb  4.7  /  TBili  0.4  /  DBili  x   /  AST  14  /  ALT  13  /  AlkPhos  74  08-07    CARDIAC MARKERS ( 06 Aug 2020 15:55 )  x     / <0.01 ng/mL / x     / x     / x              proBNP:   Lipid Profile:   HgA1c:   TSH:       CARDIAC MARKERS:            TELEMETRY EVENTS: 	    ECG:  	NSR, LVH  RADIOLOGY:  OTHER: 	    PREVIOUS DIAGNOSTIC TESTING:    [ ] Echocardiogram:  [ ]  Catheterization:  [ ] Stress Test:  	  	  ASSESSMENT/PLAN:

## 2020-08-07 NOTE — CHART NOTE - NSCHARTNOTEFT_GEN_A_CORE
This is a case of a 46 yo female KTH:  1. HTN  2. heavy menses and iron def anemia  3. migraines??  4. ovarian mass removal  5. HTN, off meds for years    recently started on anti-HTN meds (metoprolol succinate up to 50 mg QD) when routine eye dr visit revealed a SBP in the 220's; initially pt had some reduction in SBP down to the 170's but today pt had dizziness in the early am which worsened and led to nausea and vomiting.  she took her BP at the time despite being on meds was 220's/120's; when she presented to the ED her BP max was 244/114. she was very dizzy described more lightheadedness than room spinning and nauseous, and diaphoretic; patient complained of some chest pressure while coming to the ED from [place] . Patient reporting dizziness more associated with lightheadedness but no room spinning. No signs of any agitation, delirium, stupor, seizures, or visual disturbances.   She denies any chest pain, and no shortness of breath. Patient reports recently being prescribed metoprolol which she has been taking for 5 days.    ER course on admission vitals were as bp 243/121 mmHG, HR 88 bpm, patient is afebrile   labs revealed a Hg of 9.2 Cr 1.0, troponin negative   EKG done revealed T wave inversion in leads II, III, avF . UA revealed proteinuria     CT brain was done that was negative and CT abd/pelvis negative   neuro consulted NIHSS score 0, and CTAngio head and neck done     patient started on nicardipine drip, stopped today at 4 AM. currently on oral antihypertensives    Primary diagnosis of Hypertensive Urgency     Today is hospital day 1d. This morning patient was seen and examined at bedside, resting comfortably in bed.      PAST MEDICAL & SURGICAL HISTORY  Hypertension  No significant past surgical history    SOCIAL HISTORY:  Social History:  non smoker  no alcohol   no illicit drug use (06 Aug 2020 21:05)      ALLERGIES:  apple (Anaphylaxis)  No Known Drug Allergies    MEDICATIONS:  STANDING MEDICATIONS  chlorhexidine 4% Liquid 1 Application(s) Topical daily  ferrous    sulfate 325 milliGRAM(s) Oral three times a day  heparin   Injectable 5000 Unit(s) SubCutaneous every 8 hours  lisinopril 10 milliGRAM(s) Oral daily  metoprolol succinate ER 50 milliGRAM(s) Oral daily  NIFEdipine XL 30 milliGRAM(s) Oral daily  pantoprazole    Tablet 40 milliGRAM(s) Oral before breakfast    VITALS:   T(F): 98.2  HR: 80  BP: 165/97  RR: 15  SpO2: 98%    PHYSICAL EXAM:  GENERAL: NAD, well-groomed, well-developed  HEAD:  Atraumatic, Normocephalic  EYES: EOMI  NECK: Supple  NERVOUS SYSTEM:  Alert & Oriented X3, non focal   CHEST/LUNG: Clear to auscultation bilaterally; No rales, rhonchi, wheezing, or rubs  HEART: Regular rate and rhythm; No murmurs, rubs, or gallops  ABDOMEN: Soft, Nontender, Nondistended; Bowel sounds present  EXTREMITIES:  2+ Peripheral Pulses, No clubbing, cyanosis, or edema  LYMPH: No lymphadenopathy noted  SKIN: No rashes or lesions  · Assessment    This is a case of a 46 yo female KTH:  1. HTN  2. heavy menses and iron def anemia  3. migraines??  4. ovarian mass removal  5. HTN, off meds for years    Patient was admitted for hypertensive urgency /121 mmHG, HR 88 bpm,     #HTN  off niocardipine drip  evaluated by cardiology  Switched to oral meds: metoprolol, lisinopril, and procardia XR  -FU U/S duplex of the kidneys  -FU TTE    #dizziness  CT head negative  symptoms resolved after BP control  -FU MRI brain    Diet: dash diet  Activity: As tolerated  Dispo: Plan for discharge tomorrow

## 2020-08-08 LAB
ALBUMIN SERPL ELPH-MCNC: 4.6 G/DL — SIGNIFICANT CHANGE UP (ref 3.5–5.2)
ALP SERPL-CCNC: 75 U/L — SIGNIFICANT CHANGE UP (ref 30–115)
ALT FLD-CCNC: 12 U/L — SIGNIFICANT CHANGE UP (ref 0–41)
ANION GAP SERPL CALC-SCNC: 16 MMOL/L — HIGH (ref 7–14)
AST SERPL-CCNC: 14 U/L — SIGNIFICANT CHANGE UP (ref 0–41)
BASOPHILS # BLD AUTO: 0.04 K/UL — SIGNIFICANT CHANGE UP (ref 0–0.2)
BASOPHILS NFR BLD AUTO: 0.4 % — SIGNIFICANT CHANGE UP (ref 0–1)
BILIRUB SERPL-MCNC: 0.5 MG/DL — SIGNIFICANT CHANGE UP (ref 0.2–1.2)
BUN SERPL-MCNC: 18 MG/DL — SIGNIFICANT CHANGE UP (ref 10–20)
CALCIUM SERPL-MCNC: 9.5 MG/DL — SIGNIFICANT CHANGE UP (ref 8.5–10.1)
CHLORIDE SERPL-SCNC: 101 MMOL/L — SIGNIFICANT CHANGE UP (ref 98–110)
CHOLEST SERPL-MCNC: 194 MG/DL — SIGNIFICANT CHANGE UP (ref 100–200)
CO2 SERPL-SCNC: 24 MMOL/L — SIGNIFICANT CHANGE UP (ref 17–32)
CREAT SERPL-MCNC: 0.9 MG/DL — SIGNIFICANT CHANGE UP (ref 0.7–1.5)
EOSINOPHIL # BLD AUTO: 0.07 K/UL — SIGNIFICANT CHANGE UP (ref 0–0.7)
EOSINOPHIL NFR BLD AUTO: 0.7 % — SIGNIFICANT CHANGE UP (ref 0–8)
GLUCOSE SERPL-MCNC: 111 MG/DL — HIGH (ref 70–99)
HCT VFR BLD CALC: 31.1 % — LOW (ref 37–47)
HDLC SERPL-MCNC: 45 MG/DL — LOW
HGB BLD-MCNC: 8.7 G/DL — LOW (ref 12–16)
IMM GRANULOCYTES NFR BLD AUTO: 0.6 % — HIGH (ref 0.1–0.3)
LIPID PNL WITH DIRECT LDL SERPL: 110 MG/DL — SIGNIFICANT CHANGE UP (ref 4–129)
LYMPHOCYTES # BLD AUTO: 1.74 K/UL — SIGNIFICANT CHANGE UP (ref 1.2–3.4)
LYMPHOCYTES # BLD AUTO: 16.5 % — LOW (ref 20.5–51.1)
MAGNESIUM SERPL-MCNC: 2.4 MG/DL — SIGNIFICANT CHANGE UP (ref 1.8–2.4)
MCHC RBC-ENTMCNC: 20.8 PG — LOW (ref 27–31)
MCHC RBC-ENTMCNC: 28 G/DL — LOW (ref 32–37)
MCV RBC AUTO: 74.2 FL — LOW (ref 81–99)
MONOCYTES # BLD AUTO: 0.64 K/UL — HIGH (ref 0.1–0.6)
MONOCYTES NFR BLD AUTO: 6.1 % — SIGNIFICANT CHANGE UP (ref 1.7–9.3)
NEUTROPHILS # BLD AUTO: 8.02 K/UL — HIGH (ref 1.4–6.5)
NEUTROPHILS NFR BLD AUTO: 75.7 % — HIGH (ref 42.2–75.2)
NRBC # BLD: 0 /100 WBCS — SIGNIFICANT CHANGE UP (ref 0–0)
PLATELET # BLD AUTO: 382 K/UL — SIGNIFICANT CHANGE UP (ref 130–400)
POTASSIUM SERPL-MCNC: 3.8 MMOL/L — SIGNIFICANT CHANGE UP (ref 3.5–5)
POTASSIUM SERPL-SCNC: 3.8 MMOL/L — SIGNIFICANT CHANGE UP (ref 3.5–5)
PROT SERPL-MCNC: 6.6 G/DL — SIGNIFICANT CHANGE UP (ref 6–8)
RBC # BLD: 4.19 M/UL — LOW (ref 4.2–5.4)
RBC # FLD: 19.5 % — HIGH (ref 11.5–14.5)
SARS-COV-2 IGG SERPL QL IA: NEGATIVE — SIGNIFICANT CHANGE UP
SARS-COV-2 IGM SERPL IA-ACNC: 0.01 INDEX — SIGNIFICANT CHANGE UP
SODIUM SERPL-SCNC: 141 MMOL/L — SIGNIFICANT CHANGE UP (ref 135–146)
TOTAL CHOLESTEROL/HDL RATIO MEASUREMENT: 4.3 RATIO — SIGNIFICANT CHANGE UP (ref 4–5.5)
TRIGL SERPL-MCNC: 252 MG/DL — HIGH (ref 10–149)
WBC # BLD: 10.57 K/UL — SIGNIFICANT CHANGE UP (ref 4.8–10.8)
WBC # FLD AUTO: 10.57 K/UL — SIGNIFICANT CHANGE UP (ref 4.8–10.8)

## 2020-08-08 RX ORDER — ONDANSETRON 8 MG/1
4 TABLET, FILM COATED ORAL ONCE
Refills: 0 | Status: COMPLETED | OUTPATIENT
Start: 2020-08-08 | End: 2020-08-08

## 2020-08-08 RX ADMIN — Medication 325 MILLIGRAM(S): at 21:45

## 2020-08-08 RX ADMIN — LISINOPRIL 10 MILLIGRAM(S): 2.5 TABLET ORAL at 05:07

## 2020-08-08 RX ADMIN — Medication 100 MILLIGRAM(S): at 05:07

## 2020-08-08 RX ADMIN — Medication 60 MILLIGRAM(S): at 05:07

## 2020-08-08 RX ADMIN — PANTOPRAZOLE SODIUM 40 MILLIGRAM(S): 20 TABLET, DELAYED RELEASE ORAL at 05:07

## 2020-08-08 RX ADMIN — Medication 325 MILLIGRAM(S): at 14:22

## 2020-08-08 RX ADMIN — ONDANSETRON 4 MILLIGRAM(S): 8 TABLET, FILM COATED ORAL at 10:51

## 2020-08-08 RX ADMIN — Medication 325 MILLIGRAM(S): at 05:07

## 2020-08-08 RX ADMIN — HEPARIN SODIUM 5000 UNIT(S): 5000 INJECTION INTRAVENOUS; SUBCUTANEOUS at 05:07

## 2020-08-08 RX ADMIN — HEPARIN SODIUM 5000 UNIT(S): 5000 INJECTION INTRAVENOUS; SUBCUTANEOUS at 14:23

## 2020-08-08 NOTE — PROGRESS NOTE ADULT - ASSESSMENT
HTN crisis - resolving; MRI neg for HTN-related CVA  secondary LVH with Grade 1 diastolic dysfunction on echo  dizziness, N/V, diaphoresis - sxs improving  Mild/moderate left ROXANA per renal sono BUT CTA renal arteries read as neg for ROXANA; likely all related to long-standing essential HTN  - s/p nicardipine drip; BP's fluctuating but pt not symptomatic  - s/p ICU, now on regular floor  - Can follow blood and urine as outpt  - Cardiology f/u appreciated - [facility]  - Neuro consult appreciated; will follow per their recommendations.    Iron-deficient anemia  - continue to monitor Hb; continue iron  - stable at present    Remote hx of ovarian mass resection  - CT Abd/pelvis results noted.  Can f/u with markers later after initial crisis resolved    Continue all other meds and management for current conditions  Prognosis improving  All follow-up appreciated  possible DC tomorrow if BP's stay between 160's and 180's

## 2020-08-08 NOTE — PROVIDER CONTACT NOTE (OTHER) - SITUATION
Pt BP is 181/83, pt is due to receive lebatolol 100 mg. As per orders, desired range is 160-180 systolic.

## 2020-08-08 NOTE — PROGRESS NOTE ADULT - ASSESSMENT
KAELA SANCHEZ 45y Female  MRN#: [id number]   CODE STATUS:________    SUBJECTIVE  Patient is a 45y old Female who presents with a chief complaint of hypertensive crisis (07 Aug 2020 18:00)  Currently admitted to medicine with the primary diagnosis of Hypertensive emergency  Today is hospital day 2d, and this morning she is resting comfortably. Complains of nausea and slight dizziness.  No overnight events.       OBJECTIVE  PAST MEDICAL & SURGICAL HISTORY  Hypertension  No significant past surgical history    ALLERGIES:  apple (Anaphylaxis)  No Known Drug Allergies    MEDICATIONS:  STANDING MEDICATIONS  chlorhexidine 4% Liquid 1 Application(s) Topical daily  ferrous    sulfate 325 milliGRAM(s) Oral three times a day  heparin   Injectable 5000 Unit(s) SubCutaneous every 8 hours  labetalol 100 milliGRAM(s) Oral three times a day  lisinopril 10 milliGRAM(s) Oral daily  NIFEdipine XL 60 milliGRAM(s) Oral daily  ondansetron    Tablet 4 milliGRAM(s) Oral once  pantoprazole    Tablet 40 milliGRAM(s) Oral before breakfast    PRN MEDICATIONS      VITAL SIGNS: Last 24 Hours  T(C): 36.6 (08 Aug 2020 08:00), Max: 37.5 (07 Aug 2020 15:50)  T(F): 97.8 (08 Aug 2020 08:00), Max: 99.5 (07 Aug 2020 15:50)  HR: 75 (08 Aug 2020 08:00) (74 - 85)  BP: 145/85 (08 Aug 2020 08:00) (141/86 - 200/110)  BP(mean): 128 (07 Aug 2020 11:00) (123 - 128)  RR: 18 (08 Aug 2020 08:00) (15 - 21)  SpO2: 98% (07 Aug 2020 13:03) (98% - 99%)    LABS:                        8.7    10.57 )-----------( 382      ( 08 Aug 2020 06:37 )             31.1     08-08    141  |  101  |  18  ----------------------------<  111<H>  3.8   |  24  |  0.9    Ca    9.5      08 Aug 2020 06:37  Mg     2.4     08-08    TPro  6.6  /  Alb  4.6  /  TBili  0.5  /  DBili  x   /  AST  14  /  ALT  12  /  AlkPhos  75        Urinalysis Basic - ( 06 Aug 2020 18:00 )    Color: Light Yellow / Appearance: Clear / S.013 / pH: x  Gluc: x / Ketone: Small  / Bili: Negative / Urobili: <2 mg/dL   Blood: x / Protein: 30 mg/dL / Nitrite: Negative   Leuk Esterase: Small / RBC: 194 /HPF / WBC 14 /HPF   Sq Epi: x / Non Sq Epi: 3 /HPF / Bacteria: Negative            CARDIAC MARKERS ( 06 Aug 2020 15:55 )  x     / <0.01 ng/mL / x     / x     / x          RADIOLOGY:  < from: CT Angio Head w/ IV Cont (20 @ 20:10) >  IMPRESSION:    Negative CTA of the head and neck  No evidence of major vascular stenosis, occlusion, or aneurysm.    < end of copied text >    < from: CT Abdomen and Pelvis w/ IV Cont (20 @ 20:10) >  IMPRESSION: No evidence of intra-abdominal or pelvic mass or inflammatory process    No evidence of adrenal or retroperitoneal mass        < end of copied text >  < from: VA Duplex Aorta/IVC/Iliac Limited (20 @ 18:10) >    Impression:    Mild to moderate stenosis noted in left renal artery.  Right renal artery patent without stenosis    < end of copied text >    PHYSICAL EXAM:    GENERAL: NAD, well-developed, AAOx3  HEENT:  Atraumatic, Normocephalic. EOMI  PULMONARY: Clear to auscultation bilaterally; No wheeze  CARDIOVASCULAR: Regular rate and rhythm; No murmurs  GASTROINTESTINAL: Soft, Nontender, Nondistended  MUSCULOSKELETAL:  2+ Peripheral Pulses, No clubbing, cyanosis, or edema  NEUROLOGY: non-focal  SKIN: No rashes or lesions      ASSESSMENT & PLAN  This is a case of a 44 yo female with pmhx of HTN (off meds for years), iron def anemia, migraines, ovarian mass removal admitted to the ICU for hypertensive urgency /121 mmHG, HR 88 bpm    #HTN urgency  - BP this am 145/85  - sxs nausea and dizziness- improving  - s/p nicardipine drip in ICU  - evaluated by cardiology (Dr. Knott)  - Switched to oral meds: labetolol, lisinopril, and procardia XL 60 mg  - US duplex kidneys shows mild to mod stenosis of left renal artery, right renal artery patent  - Echo shows EF 66%, Grade 1 diastolic dysfunc, normal LV systolic func, mild to mod concentric LVH, mild AR  - lipid panel wnl except trig 252 and HDL 45  - f/u MRI brain  - f/u CT angio abd/pelvis    #dizziness  - CT head negative  - symptoms resolved after BP control  - F/U MRI brain results    #iron deficiency anemia  - Hb stable today 8.6  - monitor H/H    #s/p ovarian mass removal  - CT abd/pelvis shows no masses  - f/u OP for further w/u    Diet: dash diet  Activity: As tolerated  Dispo: continue monitor BP, f/u MRI brain and CT angio A/P

## 2020-08-09 VITALS — SYSTOLIC BLOOD PRESSURE: 186 MMHG | HEART RATE: 90 BPM | DIASTOLIC BLOOD PRESSURE: 103 MMHG

## 2020-08-09 LAB
ALBUMIN SERPL ELPH-MCNC: 4.4 G/DL — SIGNIFICANT CHANGE UP (ref 3.5–5.2)
ALP SERPL-CCNC: 70 U/L — SIGNIFICANT CHANGE UP (ref 30–115)
ALT FLD-CCNC: 11 U/L — SIGNIFICANT CHANGE UP (ref 0–41)
ANION GAP SERPL CALC-SCNC: 15 MMOL/L — HIGH (ref 7–14)
AST SERPL-CCNC: 12 U/L — SIGNIFICANT CHANGE UP (ref 0–41)
BILIRUB SERPL-MCNC: 0.3 MG/DL — SIGNIFICANT CHANGE UP (ref 0.2–1.2)
BUN SERPL-MCNC: 16 MG/DL — SIGNIFICANT CHANGE UP (ref 10–20)
CALCIUM SERPL-MCNC: 9.6 MG/DL — SIGNIFICANT CHANGE UP (ref 8.5–10.1)
CHLORIDE SERPL-SCNC: 100 MMOL/L — SIGNIFICANT CHANGE UP (ref 98–110)
CO2 SERPL-SCNC: 24 MMOL/L — SIGNIFICANT CHANGE UP (ref 17–32)
CREAT SERPL-MCNC: 1 MG/DL — SIGNIFICANT CHANGE UP (ref 0.7–1.5)
GLUCOSE SERPL-MCNC: 100 MG/DL — HIGH (ref 70–99)
HCT VFR BLD CALC: 30.3 % — LOW (ref 37–47)
HGB BLD-MCNC: 8.5 G/DL — LOW (ref 12–16)
MCHC RBC-ENTMCNC: 21.5 PG — LOW (ref 27–31)
MCHC RBC-ENTMCNC: 28.1 G/DL — LOW (ref 32–37)
MCV RBC AUTO: 76.5 FL — LOW (ref 81–99)
NRBC # BLD: 0 /100 WBCS — SIGNIFICANT CHANGE UP (ref 0–0)
PLATELET # BLD AUTO: 354 K/UL — SIGNIFICANT CHANGE UP (ref 130–400)
POTASSIUM SERPL-MCNC: 4 MMOL/L — SIGNIFICANT CHANGE UP (ref 3.5–5)
POTASSIUM SERPL-SCNC: 4 MMOL/L — SIGNIFICANT CHANGE UP (ref 3.5–5)
PROT SERPL-MCNC: 6.3 G/DL — SIGNIFICANT CHANGE UP (ref 6–8)
RBC # BLD: 3.96 M/UL — LOW (ref 4.2–5.4)
RBC # FLD: 20.3 % — HIGH (ref 11.5–14.5)
SODIUM SERPL-SCNC: 139 MMOL/L — SIGNIFICANT CHANGE UP (ref 135–146)
WBC # BLD: 9.29 K/UL — SIGNIFICANT CHANGE UP (ref 4.8–10.8)
WBC # FLD AUTO: 9.29 K/UL — SIGNIFICANT CHANGE UP (ref 4.8–10.8)

## 2020-08-09 RX ORDER — LISINOPRIL 2.5 MG/1
1 TABLET ORAL
Qty: 30 | Refills: 0
Start: 2020-08-09 | End: 2020-09-07

## 2020-08-09 RX ORDER — PANTOPRAZOLE SODIUM 20 MG/1
1 TABLET, DELAYED RELEASE ORAL
Qty: 30 | Refills: 0
Start: 2020-08-09 | End: 2020-09-07

## 2020-08-09 RX ORDER — NIFEDIPINE 30 MG
1 TABLET, EXTENDED RELEASE 24 HR ORAL
Qty: 30 | Refills: 0
Start: 2020-08-09 | End: 2020-09-07

## 2020-08-09 RX ORDER — METOPROLOL TARTRATE 50 MG
1 TABLET ORAL
Qty: 0 | Refills: 0 | DISCHARGE

## 2020-08-09 RX ADMIN — Medication 325 MILLIGRAM(S): at 05:14

## 2020-08-09 RX ADMIN — PANTOPRAZOLE SODIUM 40 MILLIGRAM(S): 20 TABLET, DELAYED RELEASE ORAL at 05:14

## 2020-08-09 RX ADMIN — Medication 60 MILLIGRAM(S): at 05:14

## 2020-08-09 RX ADMIN — CHLORHEXIDINE GLUCONATE 1 APPLICATION(S): 213 SOLUTION TOPICAL at 11:00

## 2020-08-09 RX ADMIN — LISINOPRIL 10 MILLIGRAM(S): 2.5 TABLET ORAL at 05:14

## 2020-08-09 NOTE — PROGRESS NOTE ADULT - SUBJECTIVE AND OBJECTIVE BOX
Pt still with fluctuating BPs but much less symptomatic overall.  Currently on labetalol 100 mg TID per ICU team, Lisinopril 10 mg QD, Procardia XL 30 mg QD.  Extra 30 mg Procardia given during the day today when BP was elevated above 200.    Renal sono showing mild/moderate left ROXANA.  CT angio of renal arteries pending.  Echo noted - mild/moderate concentric LVH with increased  MRI brain to f/u CT/CTA head also pending.    PE VSS NAD  Lungs CTA  Heart RRR s1s2  Abd benign  Ext no c/c/e
24H events:    46 yo female with Pmhof HTN, heavy menses and iron def anemia, ? migraines, ovarian mass removal; no f/u and no meds for years; recently started on anti-HTN meds (metoprolol succinate up to 50 mg QD) when routine eye dr visit revealed a SBP in the 220's; initially pt had some reduction in SBP down to the 170's but today pt had dizziness in the early am which worsened and led to nausea and vomiting.  she took her BP at the time despite being on meds was 220's/120's; when she presented to the ED her BP max was 244/114. she was very dizzy described more lightheadedness than room spinning and nauseous, and diaphoretic; patient complained of some chest pressure while coming to the ED from [place] . Patient reporting dizziness more associated with lightheadedness but no room spinning. No signs of any agitation, delirium, stupor, seizures, or visual disturbances.   She denies any chest pain, and no shortness of breath. Patient reports recently being prescribed metoprolol which she has been taking for 5 days.    ER course on admission vitals were as bp 243/121 mmHG, HR 88 bpm, patient is afebrile   labs revealed a Hg of 9.2 Cr 1.0, troponin negative   EKG done revealed T wave inversion in leads II, III, avF . UA revealed proteinuria     CT brain was done that was negative and CT abd/pelvis negative   neuro consulted NIHSS score 0, and CTAngio head and neck done   patient started on nicardipine drip now off    Primary diagnosis of Hypertensive emergency     Today is hospital day 1d. This morning patient was seen and examined at bedside, resting comfortably in bed.        PAST MEDICAL & SURGICAL HISTORY  Hypertension  No significant past surgical history    SOCIAL HISTORY:  Social History:  non smoker  no alcohol   no illicit drug use (06 Aug 2020 21:05)      ALLERGIES:  apple (Anaphylaxis)  No Known Drug Allergies    MEDICATIONS:  STANDING MEDICATIONS  chlorhexidine 4% Liquid 1 Application(s) Topical daily  ferrous    sulfate 325 milliGRAM(s) Oral three times a day  heparin   Injectable 5000 Unit(s) SubCutaneous every 8 hours  lisinopril 10 milliGRAM(s) Oral daily  metoprolol succinate ER 50 milliGRAM(s) Oral daily  NIFEdipine XL 30 milliGRAM(s) Oral daily  pantoprazole    Tablet 40 milliGRAM(s) Oral before breakfast    PRN MEDICATIONS    VITALS:   T(F): 98.2  HR: 80  BP: 165/97  RR: 15  SpO2: 98%    PHYSICAL EXAM:  GENERAL: NAD, well-groomed, well-developed  HEAD:  Atraumatic, Normocephalic  EYES: EOMI  NECK: Supple  NERVOUS SYSTEM:  Alert & Oriented X3, non focal   CHEST/LUNG: Clear to auscultation bilaterally; No rales, rhonchi, wheezing, or rubs  HEART: Regular rate and rhythm; No murmurs, rubs, or gallops  ABDOMEN: Soft, Nontender, Nondistended; Bowel sounds present  EXTREMITIES:  2+ Peripheral Pulses, No clubbing, cyanosis, or edema  LYMPH: No lymphadenopathy noted  SKIN: No rashes or lesions  LABS:                        8.3    13.75 )-----------( 379      ( 07 Aug 2020 04:48 )             28.8     08-07    138  |  101  |  14  ----------------------------<  116<H>  3.5   |  23  |  0.9    Ca    9.7      07 Aug 2020 04:48  Mg     2.5     08-07    TPro  6.7  /  Alb  4.7  /  TBili  0.4  /  DBili  x   /  AST  14  /  ALT  13  /  AlkPhos  74  08-07      Urinalysis Basic - ( 06 Aug 2020 18:00 )    Color: Light Yellow / Appearance: Clear / S.013 / pH: x  Gluc: x / Ketone: Small  / Bili: Negative / Urobili: <2 mg/dL   Blood: x / Protein: 30 mg/dL / Nitrite: Negative   Leuk Esterase: Small / RBC: 194 /HPF / WBC 14 /HPF   Sq Epi: x / Non Sq Epi: 3 /HPF / Bacteria: Negative        Troponin T, Serum: <0.01 ng/mL (20 @ 15:55)      CARDIAC MARKERS ( 06 Aug 2020 15:55 )  x     / <0.01 ng/mL / x     / x     / x          RADIOLOGY:    < from: CT Head No Cont (20 @ 20:10) >  No CT evidence of acute intracranial pathology.  No CT evidence of acute fracture, intracranial hemorrhage, midline shift, or mass effect.    < end of copied text >
Pt BP's stabilizing.  Ranging today from 140's to 170's.  Pt overall feels a little better.    CT angio of renal arteries negative for ROXANA  MRI brain neg for HTN-related ischemia    Current meds: Procardia XL 60 mg QD, Lisinopril 10 mg; ? on/off mild cough since starting lisinopril    VSS NAD tired and occasionally dizzy  Lungs CTA  Heart RRR s1s2  Abd benign  Ext no c/c/e
Pt stable, still at baseline    BP in 170s to 180s    PE VSS NAD  Lungs CTA  Heart RRR sis2  And benign  Ext no c/c/e
Pt well known to me, 44 yo with distant past hx >10 yrs of HTN, heavy menses, ovarian mass removal; no f/u and no meds for years; recently started on anti-HTN meds (metoprolol succinate up to 50 mg QD) when routine eye dr visit revealed a SBP in the 220's and pt reached out to us; initially pt had some reduction in SBP down to the 170's but today pt had dizziness in the early am which progressed and led to nausea and vomiting.  BP at the time despite being on meds was 220's/120's; when she presented to the ED her BP max was 244/114. Pt also very dizzy and nauseous, and diaphoretic.  No SOB and no CP.    Pt also was found to have Hb of 8.1 on recent labs, picture consistent with Fe-deficient anemia related to blood loss from menses; started on Iron 325 mg BID.    In ED, labs showed Hb 9.2, nl WBC; lytes generally WNL; troponins neg to date; UA noted.      CXR ? cardiomegaly (despite AP film)    EKG ? LVH by criteria, ? strain pattern (inverted t waves in III and AVF)    PE VSS tired, dizzy  HEENT WNL  Lungs CTA  Heart RRR s1s2  Abd benign  Ext no c/c/e
SUBJ:  No new complains    MEDICATIONS  (STANDING):  chlorhexidine 4% Liquid 1 Application(s) Topical daily  ferrous    sulfate 325 milliGRAM(s) Oral three times a day  heparin   Injectable 5000 Unit(s) SubCutaneous every 8 hours  labetalol 100 milliGRAM(s) Oral three times a day  lisinopril 10 milliGRAM(s) Oral daily  NIFEdipine XL 60 milliGRAM(s) Oral daily  pantoprazole    Tablet 40 milliGRAM(s) Oral before breakfast    MEDICATIONS  (PRN):            Vital Signs Last 24 Hrs  T(C): 36.6 (08 Aug 2020 08:00), Max: 37.5 (07 Aug 2020 15:50)  T(F): 97.8 (08 Aug 2020 08:00), Max: 99.5 (07 Aug 2020 15:50)  HR: 75 (08 Aug 2020 08:00) (74 - 85)  BP: 145/85 (08 Aug 2020 08:00) (141/86 - 200/110)  BP(mean): --  RR: 18 (08 Aug 2020 08:00) (17 - 21)  SpO2: --     REVIEW OF SYSTEMS:  CONSTITUTIONAL: No fever, weight loss, or fatigue  CARDIOLOGY: PAtient denies chest pain, shortness of breath or syncopal episodes.   RESPIRATORY: denies shortness of breath, wheezeing.   NEUROLOGICAL: NO weakness, no focal deficits to report.  ENDOCRINOLOGICAL: no recent change in diabetic medications.   GI: no BRBPR, no N,V,diarrhea.    PSYCHIATRY: normal mood and affect  HEENT: no nasal discharge, no ecchymosis  SKIN: no ecchymosis, no breakdown  MUSCULOSKELETAL: Full range of motion x4.        PHYSICAL EXAM:  · CONSTITUTIONAL:	Well-developed, well nourished    BMI-  ·RESPIRATORY:   airway patent; breath sounds equal; good air movement; respirations non-labored; clear to auscultation bilaterally; no chest wall tenderness; no intercostal retractions; no rales,rhonchi or wheeze  · CARDIOVASCULAR	regular rate and rhythm  no rub  no murmur  normal PMI  · EXTREMITIES: No cyanosis, clubbing or edema  · VASCULAR: 	Equal and normal pulses (carotid, femoral, dorsalis pedis)  	  TELEMETRY:    ECG:    TTE:    LABS:                        8.7    10.57 )-----------( 382      ( 08 Aug 2020 06:37 )             31.1     08-08    141  |  101  |  18  ----------------------------<  111<H>  3.8   |  24  |  0.9    Ca    9.5      08 Aug 2020 06:37  Mg     2.4     08-08    TPro  6.6  /  Alb  4.6  /  TBili  0.5  /  DBili  x   /  AST  14  /  ALT  12  /  AlkPhos  75  08-08    CARDIAC MARKERS ( 06 Aug 2020 15:55 )  x     / <0.01 ng/mL / x     / x     / x              I&O's Summary    07 Aug 2020 07:01  -  08 Aug 2020 07:00  --------------------------------------------------------  IN: 820 mL / OUT: 700 mL / NET: 120 mL      BNP  RADIOLOGY & ADDITIONAL STUDIES:    IMPRESSION AND PLAN:    HTN better  Echo moderate LVH  discussed with family
SUBJ: No new complains      MEDICATIONS  (STANDING):  chlorhexidine 4% Liquid 1 Application(s) Topical daily  ferrous    sulfate 325 milliGRAM(s) Oral three times a day  heparin   Injectable 5000 Unit(s) SubCutaneous every 8 hours  labetalol 100 milliGRAM(s) Oral three times a day  lisinopril 10 milliGRAM(s) Oral daily  NIFEdipine XL 60 milliGRAM(s) Oral daily  pantoprazole    Tablet 40 milliGRAM(s) Oral before breakfast    MEDICATIONS  (PRN):            Vital Signs Last 24 Hrs  T(C): 36.7 (09 Aug 2020 07:03), Max: 36.7 (09 Aug 2020 07:03)  T(F): 98 (09 Aug 2020 07:03), Max: 98 (09 Aug 2020 07:03)  HR: 90 (09 Aug 2020 11:47) (79 - 91)  BP: 186/103 (09 Aug 2020 11:47) (160/82 - 190/98)  BP(mean): --  RR: 18 (09 Aug 2020 07:03) (18 - 18)  SpO2: --     REVIEW OF SYSTEMS:  CONSTITUTIONAL: No fever, weight loss, or fatigue  CARDIOLOGY: PAtient denies chest pain, shortness of breath or syncopal episodes.   RESPIRATORY: denies shortness of breath, wheezeing.   NEUROLOGICAL: NO weakness, no focal deficits to report.  ENDOCRINOLOGICAL: no recent change in diabetic medications.   GI: no BRBPR, no N,V,diarrhea.    PSYCHIATRY: normal mood and affect  HEENT: no nasal discharge, no ecchymosis  SKIN: no ecchymosis, no breakdown  MUSCULOSKELETAL: Full range of motion x4.        PHYSICAL EXAM:  · CONSTITUTIONAL:	Well-developed, well nourished    BMI-  ·RESPIRATORY:   airway patent; breath sounds equal; good air movement; respirations non-labored; clear to auscultation bilaterally; no chest wall tenderness; no intercostal retractions; no rales,rhonchi or wheeze  · CARDIOVASCULAR	regular rate and rhythm  no rub  no murmur  normal PMI  · EXTREMITIES: No cyanosis, clubbing or edema  · VASCULAR: 	Equal and normal pulses (carotid, femoral, dorsalis pedis)  	  TELEMETRY:    ECG:    TTE:    LABS:                        8.5    9.29  )-----------( 354      ( 09 Aug 2020 04:30 )             30.3     08-09    139  |  100  |  16  ----------------------------<  100<H>  4.0   |  24  |  1.0    Ca    9.6      09 Aug 2020 04:30  Mg     2.4     08-08    TPro  6.3  /  Alb  4.4  /  TBili  0.3  /  DBili  x   /  AST  12  /  ALT  11  /  AlkPhos  70  08-09            I&O's Summary    BNP  RADIOLOGY & ADDITIONAL STUDIES:    IMPRESSION AND PLAN:

## 2020-08-09 NOTE — PROVIDER CONTACT NOTE (OTHER) - SITUATION
pt has bedrest order, however, as per report pt ambulates independently. is pt required to be on bedrest.

## 2020-08-09 NOTE — DISCHARGE NOTE PROVIDER - NSDCMRMEDTOKEN_GEN_ALL_CORE_FT
ferrous sulfate 325 mg (65 mg elemental iron) oral tablet: 1 tab(s) orally 3 times a day  lisinopril 10 mg oral tablet: 1 tab(s) orally once a day  NIFEdipine 60 mg oral tablet, extended release: 1 tab(s) orally once a day  pantoprazole 40 mg oral delayed release tablet: 1 tab(s) orally once a day (before a meal)

## 2020-08-09 NOTE — DISCHARGE NOTE NURSING/CASE MANAGEMENT/SOCIAL WORK - PATIENT PORTAL LINK FT
You can access the FollowMyHealth Patient Portal offered by [facility] by registering at the following website: [email or web address]. By joining [employer]’s FollowMyHealth portal, you will also be able to view your health information using other applications (apps) compatible with our system.

## 2020-08-09 NOTE — DISCHARGE NOTE PROVIDER - HOSPITAL COURSE
HPI:    44 yo female with pmh of HTN, heavy menses and iron def anemia, ? migraines, ovarian mass removal; no f/u and no meds for years; recently started on anti-HTN meds (metoprolol succinate up to 50 mg QD) when routine eye dr visit revealed a SBP in the 220's; initially pt had some reduction in SBP down to the 170's but today pt had dizziness in the early am which worsened and led to nausea and vomiting.  she took her BP at the time despite being on meds was 220's/120's; when she presented to the ED her BP max was 244/114. she was very dizzy described more lightheadedness than room spinning and nauseous, and diaphoretic; patient complained of some chest pressure while coming to the ED from [place] . Patient reporting dizziness more associated with lightheadedness but no room spinning. No signs of any agitation, delirium, stupor, seizures, or visual disturbances.   She denies any chest pain, and no shortness of breath. Patient reports recently being prescribed metoprolol which she has been taking for 5 days.        ER course on admission vitals were as bp 243/121 mmHG, HR 88 bpm, patient is afebrile     labs revealed a Hg of 9.2 Cr 1.0, troponin negative     EKG done revealed T wave inversion in leads II, III, avF . UA revealed proteinuria         CT brain was done that was negative and CT abd/pelvis negative     neuro consulted NIHSS score 0, and CTAngio head and neck done     patient started on nicardipine drip now off (06 Aug 2020 21:05)        Patient was admitted to ICU and started on nicardipine drip, then switched to metoprolol, lisinopril, and procardia XR. Pressures improved and she was downgraded to medicine floors. Patient's symptoms improved and she was discharged to home with outpatient bp management.

## 2020-08-09 NOTE — PROGRESS NOTE ADULT - ASSESSMENT
HTN crisis - resolving; MRI neg for HTN-related CVA  secondary LVH with Grade 1 diastolic dysfunction on echo  dizziness, N/V, diaphoresis - sxs improving  Mild/moderate left ROXANA per renal sono BUT CTA renal arteries read as neg for ROXANA; likely all related to long-standing essential HTN  - s/p nicardipine drip; BP's fluctuating between 170s and 180s but pt not symptomatic  - s/p ICU, now on regular floor  - Can follow blood and urine as outpt  - Cardiology f/u appreciated - [facility]  - Neuro consult appreciated; will follow per their recommendations.  - DC home today    Iron-deficient anemia  - continue to monitor Hb; continue iron  - stable at present    Remote hx of ovarian mass resection  - CT Abd/pelvis results noted.  Can f/u with markers later after initial crisis resolved    Continue all other meds and management for current conditions  Prognosis good  All follow-up appreciated  DC home with goal of BP's to stay between 160's and 180's

## 2020-08-09 NOTE — PROGRESS NOTE ADULT - NSHPATTENDINGPLANDISCUSS_GEN_ALL_CORE
pt, , house team, Cardiology ([facility])
pt, , Cardiology ([facility]), house team
pt, , house team
pt, , house team, Cardiology ([facility])

## 2020-08-09 NOTE — PROGRESS NOTE ADULT - ATTENDING COMMENTS
I am the attg for this pt today and my notes are above.
I am the primary care attg for this pt today and my notes are above.

## 2020-08-09 NOTE — PROVIDER CONTACT NOTE (OTHER) - ACTION/TREATMENT ORDERED:
MD aware, stated to re-check in an hour.
provider to review chart and D/C bedrest order if not needed
As per MD, give lisinopril and procardia. Hold lebatolol and reassess BP in an hour.
As per MD, hold labetolol dose as this is the desired range for pt.
As per MD, hold lebatolol.
As per MD, no intervention at this time.
As per MD orders, hold lebatolol at this time and reassess BP in about two hours.

## 2020-08-09 NOTE — DISCHARGE NOTE PROVIDER - CARE PROVIDER_API CALL
KARELY WASHINGTON  Internal Medicine  [address] [place] [address] [address] [place] [address]  Phone: [phone]  Fax: [phone]  Follow Up Time: 2 weeks

## 2020-08-09 NOTE — PROGRESS NOTE ADULT - PROVIDER SPECIALTY LIST ADULT
Cardiology
Cardiology
Critical Care
Internal Medicine

## 2020-08-09 NOTE — DISCHARGE NOTE PROVIDER - NSDCCPCAREPLAN_GEN_ALL_CORE_FT
PRINCIPAL DISCHARGE DIAGNOSIS  Diagnosis: Hypertensive emergency  Assessment and Plan of Treatment: Hypertension  Hypertension, commonly called high blood pressure, is when the force of blood pumping through your arteries is too strong. Hypertension forces your heart to work harder to pump blood. Your arteries may become narrow or stiff. Having untreated or uncontrolled hypertension for a long period of time can cause heart attack, stroke, kidney disease, and other problems. If started on a medication, take exactly as prescribed by your health care professional. Maintain a healthy lifestyle and follow up with your primary care physician.  SEEK IMMEDIATE MEDICAL CARE IF YOU HAVE ANY OF THE FOLLOWING SYMPTOMS: severe headache, confusion, chest pain, abdominal pain, vomiting, or shortness of breath.      SECONDARY DISCHARGE DIAGNOSES  Diagnosis: Dizziness  Assessment and Plan of Treatment:

## 2020-08-10 LAB
A1C WITH ESTIMATED AVERAGE GLUCOSE RESULT: 5.8 % — HIGH (ref 4–5.6)
ESTIMATED AVERAGE GLUCOSE: 120 MG/DL — HIGH (ref 68–114)

## 2020-08-13 DIAGNOSIS — I16.0 HYPERTENSIVE URGENCY: ICD-10-CM

## 2020-08-13 DIAGNOSIS — D50.9 IRON DEFICIENCY ANEMIA, UNSPECIFIED: ICD-10-CM

## 2020-08-13 DIAGNOSIS — Z91.018 ALLERGY TO OTHER FOODS: ICD-10-CM

## 2020-08-13 DIAGNOSIS — R42 DIZZINESS AND GIDDINESS: ICD-10-CM

## 2020-08-13 DIAGNOSIS — G43.909 MIGRAINE, UNSPECIFIED, NOT INTRACTABLE, WITHOUT STATUS MIGRAINOSUS: ICD-10-CM

## 2020-08-13 DIAGNOSIS — N83.8 OTHER NONINFLAMMATORY DISORDERS OF OVARY, FALLOPIAN TUBE AND BROAD LIGAMENT: ICD-10-CM

## 2020-08-13 DIAGNOSIS — I10 ESSENTIAL (PRIMARY) HYPERTENSION: ICD-10-CM

## 2023-03-27 NOTE — ED PROVIDER NOTE - DATE/TIME 1
06-Aug-2020 16:57 Please Choose The Type Of Visit (Required): Treatment Visit: Show Treatment Variables